# Patient Record
(demographics unavailable — no encounter records)

---

## 2017-06-06 NOTE — PD
HPI


Chief Complaint:  GI Complaint


Time Seen by Provider:  18:50


Travel History


International Travel<30 days:  No


Contact w/Intl Traveler<30days:  No


Traveled to known affect area:  No





History of Present Illness


HPI


17-year-old female came to the emergency room with history of perianal lesions 

that she said she has been noticing for past 3 days and they are extremely 

painful and burns.  Patient is sexually active and has had practiced 

unprotected sex.  She does not want her parents to know about this.  Vital 

signs are otherwise stable.  Patient is otherwise a healthy person.  Patient 

denied of any vaginal discharge.





[facility]


Past Medical History


*** Narrative Medical


List of her past medical, surgical, social and family history as reviewed from 

the nursing note.


Medical History:  Denies Significant Hx


Diminished Hearing:  No


Immunizations Current:  Yes


Tetanus Vaccination:  Unknown


Influenza Vaccination:  No


Pregnant?:  Not Pregnant


LMP:  2017


:  1


Para:  0


Miscarriage:  1


Dilation and Curettage (D&C):  Yes





Social History


Alcohol Use:  Yes (OCCASIONALLY )


Tobacco Use:  No


Substance Use:  Yes (MARIJUANA, EVERY THREE DAYS )





Allergies-Medications


(Allergen,Severity, Reaction):  


Coded Allergies:  


     No Known Allergies (Unverified , 10/13/16)


Comments


List of her allergies reviewed from the nursing note.


Reported Meds & Prescriptions





Reported Meds & Active Scripts


Active


Flagyl (Metronidazole) 250 Mg Tab 250 Mg PO TID 7 Days


Acyclovir 200 Mg Cap 200 Mg PO 5 TIMES A DAY 7 Days


Tramadol (Tramadol HCl) 50 Mg Tab 50 Mg PO Q6H PRN


Orphenadrine CR (Orphenadrine Citrate) 100 Mg Tab 100 Mg PO Q12HR


Ibuprofen 600 Mg Tab 600 Mg PO Q6H PRN





Narrative Medication


List of her home medications reviewed from the nursing note.





Review of Systems


Except as stated in HPI:  all other systems reviewed are Neg





Physical Exam


Narrative


GENERAL: Awake, alert, anxious, moderate distress


SKIN: Focused skin assessment warm/dry.


HEAD: Atraumatic. Normocephalic. 


EYES: Pupils equal and round. No scleral icterus. No injection or drainage. 


ENT: No nasal bleeding or discharge.  Mucous membranes pink and moist.


NECK: Trachea midline. No JVD. 


CARDIOVASCULAR: Regular rate and rhythm.  No murmur appreciated.


RESPIRATORY: No accessory muscle use. Clear to auscultation. Breath sounds 

equal bilaterally. 


GASTROINTESTINAL: Abdomen soft, non-tender, nondistended. Hepatic and splenic 

margins not palpable. 


: External inspection shows multiple confluent vesicular lesions in the 

Perianal and the perineal area.  Foul-smelling discharge was noticed to be 

coming out from the introitus.  Speculum exam showed whitish foul smelling 

discharge.  No obvious CMT or adnexal tenderness.


MUSCULOSKELETAL: No obvious deformities. No clubbing.  No cyanosis.  No edema. 


NEUROLOGICAL: Awake and alert. No obvious cranial nerve deficits.  Motor 

grossly within normal limits. Normal speech.


PSYCHIATRIC: Appropriate mood and affect; insight and judgment normal.





Data


Data


Last Documented VS





Vital Signs








  Date Time  Temp Pulse Resp B/P Pulse Ox O2 Delivery O2 Flow Rate FiO2


 


17 18:30   18     


 


17 17:31 98.2 80  124/77 99   








Orders





 Gc And Chlamydia Pcr (17 19:32)


Wet Prep Profile (17 19:32)


Urinalysis - C+S If Indicated (17 19:32)


Metronidazole (Flagyl) (17 19:45)


Azithromycin Powd Pack (Zithromax Powd P (17 19:45)


Ceftriaxone Inj (Rocephin Inj) (17 19:45)


Ed Urine Pregnancytest Poc (17 19:32)


Acyclovir (Zovirax) (17 20:45)





Labs





 Laboratory Tests








Test 17





 20:15


 


Urine Color LIGHT-YELLOW 


 


Urine Turbidity CLEAR 


 


Urine pH 7.0 


 


Urine Specific Gravity 1.008 


 


Urine Protein NEG mg/dL


 


Urine Glucose (UA) NEG mg/dL


 


Urine Ketones NEG mg/dL


 


Urine Occult Blood NEG 


 


Urine Nitrite NEG 


 


Urine Bilirubin NEG 


 


Urine Urobilinogen LESS THAN 2.0





 MG/DL


 


Urine Leukocyte Esterase SMALL 


 


Urine RBC 1 /hpf


 


Urine WBC 4 /hpf


 


Urine Squamous Epithelial <1 /hpf





Cells 


 


Urine Bacteria RARE /hpf


 


Urine Mucus FEW /lpf


 


Urine Trichomonas PRESENT 


 


Microscopic Urinalysis Comment CULT NOT





 INDICATED


 


Clue Cells (Wet Prep) NONE SEEN 


 


Vaginal Trichomonas (Wet Prep) PRESENT 


 


Vaginal Yeast (Wet Prep) NONE SEEN 


 


Chlamydia trachomatis DNA NOT DETECTED 





(PCR) 


 


Neisseria gonorrhoeae DNA NOT DETECTED 





(PCR) 











MDM


Medical Decision Making


Medical Screen Exam Complete:  Yes


Emergency Medical Condition:  Yes


Medical Record Reviewed:  Yes


Differential Diagnosis


Herpes, STD, pregnancy


Narrative Course


8:47 PM lesions are very specific for herpes genitalis.  Wet mount is positive 

for Trichomonas.  GC and chlamydia are pending.  However given the exam I 

prefer to treat her for PID and acyclovir was given as well.  I discussed this 

with the patient.  She'll be discharged home on description for Flagyl and 

acyclovir.





Procedures


EKG Prior to Arrival:  No





Diagnosis





 Primary Impression:  


 PID (acute pelvic inflammatory disease)


 Additional Impressions:  


 Trichomonas vaginitis


 Herpes genitalis in women


Referrals:  


Primary Care Physician





***Additional Instructions:


Please take the medication as per the prescription direction.  You should not 

be having unprotected sex for the next 3 weeks.  Your partner/partners needs to 

be treated as well.  Please return to the ER if the condition worsens or any 

other concerns.


***Med/Other Pt SpecificInfo:  Prescription(s) given


Scripts


Metronidazole (Flagyl)250 Mg [id number] Mg PO TID  7 Days  Ref 0


   Prov:Yumiko Wright MD         17 


Acyclovir 200 Mg [id number] Mg PO 5 TIMES A DAY  7 Days  Ref 0


   Prov:Yumiko Wright MD         17


Disposition:  01 DISCHARGE HOME


Condition:  Stable








Yumiko Wright MD 2017 19:23

## 2017-10-07 NOTE — PD
HPI


Chief Complaint:  Gyn Problem/Complaint


Time Seen by Provider:  00:10


Travel History


International Travel<30 days:  No


Contact w/Intl Traveler<30days:  No


Traveled to known affect area:  No





History of Present Illness


HPI


17-year-old female patient who states she is several months pregnant but does 

not have OB/GYN follow-up yet, presents to the ER today for abdominal 

discomfort and several days of whitish foul smelling discharge.  She denies any 

vomiting, fevers, or any other symptoms.  Pain is a 4 out of 10.  She does not 

know any exacerbating alleviating factors.





Modifying Factors: None


Associated Signs & Symptoms: Pregnant, abdominal pains, whitish vaginal 

discharge


Risk Factors: None





PFSH


Past Medical History


Medical History:  Denies Significant Hx


Diminished Hearing:  No


Immunizations Current:  Yes


Pregnant?:  Pregnant


LMP:  17


:  2


Para:  0


Miscarriage:  1


Dilation and Curettage (D&C):  Yes





Social History


Alcohol Use:  No


Tobacco Use:  No


Substance Use:  Yes (MARIJUANA, EVERY THREE DAYS )





Allergies-Medications


(Allergen,Severity, Reaction):  


Coded Allergies:  


     No Known Allergies (Unverified , 10/6/17)


Reported Meds & Prescriptions





Reported Meds & Active Scripts


Active


Flagyl (Metronidazole) 250 Mg Tab 250 Mg PO TID 7 Days


Acyclovir 200 Mg Cap 200 Mg PO 5 TIMES A DAY 7 Days


Tramadol (Tramadol HCl) 50 Mg Tab 50 Mg PO Q6H PRN


Orphenadrine CR (Orphenadrine Citrate) 100 Mg Tab 100 Mg PO Q12HR


Ibuprofen 600 Mg Tab 600 Mg PO Q6H PRN








Review of Systems


Except as stated in HPI:  all other systems reviewed are Neg





Physical Exam


Narrative


GENERAL: Young -American female patient currently in mild distress.  

Awake and oriented 3.


SKIN: Focused skin assessment warm/dry.


HEAD: Atraumatic. Normocephalic. 


EYES: Pupils equal and round. No scleral icterus. No injection or drainage. 


ENT: No nasal bleeding or discharge.  Mucous membranes pink and moist.


NECK: Trachea midline. No JVD. 


CARDIOVASCULAR: Regular rate and rhythm.  No murmur appreciated.


RESPIRATORY: No accessory muscle use. Clear to auscultation. Breath sounds 

equal bilaterally. 


GASTROINTESTINAL: Abdomen gravid, with uterine fundus at the umbilical area, non

-tender, nondistended. Hepatic and splenic margins not palpable. 


MUSCULOSKELETAL: No obvious deformities. No clubbing.  No cyanosis.  No edema. 


NEUROLOGICAL: Awake and alert. No obvious cranial nerve deficits.  Motor 

grossly within normal limits. Normal speech.


PSYCHIATRIC: Appropriate mood and affect; insight and judgment normal.





Data


Data


Last Documented VS





Vital Signs








  Date Time  Temp Pulse Resp B/P (MAP) Pulse Ox O2 Delivery O2 Flow Rate FiO2


 


10/6/17 23:54  90 18 110/60 (77) 100 Room Air  


 


10/6/17 22:45 98.8       








Orders





 Orders


Gc And Chlamydia Pcr (10/7/17 00:10)


Wet Prep Profile (10/7/17 00:10)


Urinalysis - C+S If Indicated (10/7/17 00:10)


Ed Poc Ultrasound (10/7/17 )


Urine Culture (10/7/17 00:15)


Metronidazole (Flagyl) (10/7/17 01:45)





Labs





Laboratory Tests








Test


  10/7/17


00:15 10/7/17


00:50


 


Urine Color YELLOW  


 


Urine Turbidity HAZY  


 


Urine pH 6.0  


 


Urine Specific Gravity 1.026  


 


Urine Protein TRACE mg/dL  


 


Urine Glucose (UA) NEG mg/dL  


 


Urine Ketones NEG mg/dL  


 


Urine Occult Blood NEG  


 


Urine Nitrite NEG  


 


Urine Bilirubin NEG  


 


Urine Urobilinogen


  LESS THAN 2.0


MG/DL 


 


 


Urine Leukocyte Esterase LARGE  


 


Urine RBC 4 /hpf  


 


Urine WBC 7 /hpf  


 


Urine Squamous Epithelial


Cells 2 /hpf 


  


 


 


Urine Bacteria MOD /hpf  


 


Urine Yeast (Budding) RARE  


 


Microscopic Urinalysis Comment


  CULTURE


INDICATED 


 


 


Clue Cells (Wet Prep)  PRESENT 


 


Vaginal Trichomonas (Wet Prep)  NS 


 


Vaginal Yeast (Wet Prep)  PRESENT 











MDM


Medical Decision Making


Medical Screen Exam Complete:  Yes


Emergency Medical Condition:  Yes


Medical Record Reviewed:  Yes


Interpretation(s)





Laboratory Tests








Test


  10/7/17


00:15 10/7/17


00:50


 


Urine Turbidity HAZY (CLEAR)  


 


Urine Leukocyte Esterase LARGE (NEG)  


 


Urine RBC 4 /hpf (0-3)  


 


Urine WBC 7 /hpf (0-5)  


 


Urine Bacteria


  MOD /hpf


(NONE) 


 


 


Urine Yeast (Budding) RARE (NONE)  


 


Clue Cells (Wet Prep)  PRESENT (NONE) 


 


Vaginal Yeast (Wet Prep)  PRESENT (NONE) 








Differential Diagnosis


Abdominal discomfort, pregnant, vaginal discharge: UTI versus vaginitis versus 

abdominal pain and pregnancy versus threatened AB


Narrative Course


Transabdominal ultrasound and evaluation shows a IUP with uterine fundus at 

about the umbilicus, and patient states that she has not had her menses since 

 or July, she is likely to be about 20 weeks along.  Lab work shows UTI and 

wet prep is positive for both clue cells and yeast.  At this point, my plan 

would be to treat her for all 3 issues.  She was given Flagyl in the ER.  She 

will be given UTI treatment as well as yeast infection treatment.  Follow-up 

with OB/GYN.  Return for new issues as needed.  The plan has been discussed 

with her and she states understanding.





Procedures


**Procedure Narrative**


Transabdominal ultrasound done by me shows IUP with good fetal heart tones in 

the 140s.  Good fetal movements.





Diagnosis





 Primary Impression:  


 Abdominal pain in pregnancy


 Additional Impressions:  


 BV (bacterial vaginosis)


 UTI (urinary tract infection)


 Candidal vaginitis


***Med/Other Pt SpecificInfo:  Prescription(s) given


Scripts


Miconazole 3 Vaginal Cream (Monistat 3 Vaginal Cream) 4 % Cream


1 APPL VAGINAL HS for Infection, #1 BOX 0 Refills


   Prov: Omi Quiroz MD         10/7/17 


Metronidazole (Flagyl) 500 Mg Tab


500 MG PO BID for Infection for 7 Days, #14 TAB 0 Refills


   Prov: Omi Quiroz MD         10/7/17 


Amoxicillin (Amoxicillin) 500 Mg Tab


500 MG PO BID for Infection for 7 Days, #14 TAB 0 Refills


   Prov: Omi Quiroz MD         10/7/17


Disposition:  01 DISCHARGE HOME


Condition:  Stable











Omi Quiroz MD Oct 7, 2017 00:13

## 2017-12-06 NOTE — PD
HPI


Chief Complaint


Low blood


Date Seen:  Dec 6, 2017


Time Seen:  17:32


Travel History


International Travel<30 Days:  No


Contact w/Intl Traveler<30Days:  No


Known Affected Area:  No





History of Present Illness


HPI


Patient 18-year-old white female  at 29 week [facility] 

and sent here by them today for anemia.  Patient had her blood drawn yesterday 

with a hemoglobin of 6.6 which also correlates with  hematocrit of about 19-20% 

on the patient is not on iron therapy at this time, she does eat   cornstarch a 

lot.  Baby is active fetal heart rate tracing is reactive for 29 weeks she is 

not cynthia


Weeks Gestation:  29


Para:  0


:  2


Miscarriage:  1





History


Obstetric History


Obstetric History


1 pregnancy loss early





Social History


*** Narrative Social History


Patient states that she thought records starts so she is a patient with pica


Alcohol Use:  No


Tobacco Use:  No


Substance Abuse:  No





Allergies-Medications


(Allergen,Severity, Reaction):  


Coded Allergies:  


     No Known Allergies (Unverified , 10/16/17)


Home Meds


Active Scripts


Acyclovir (Acyclovir) 400 Mg Tab, 400 MG PO BID for Mgmt Viral Infection, #60 

TAB 7 Refills


   Prov:Apple Fitzpatrick         17


Multi-Vit/Iron-Folic Acid-B12-Vit C (Ferralet) 90-1-0.012-120 mg Tab, 1 TAB PO 

DAILY, #30 BOTTLE 11 Refills


   Prov:Apple Fitzpatrick         10/16/17


Prenatal Vit W/ Ferric Phospha (Vitafol Gummies 3.33-0.333-34.8 mg) 1 Chw Chw, 

3 TAB PO DAILY, #90 BOTTLE 11 Refills


   Prov:Apple Fitzpatrick         10/16/17





Review of Systems


General / Constitutional:  No: Fever, Weight Gain, Chills, Other


Eyes:  No: Diploplia, Blurred Vision, Visual changes, Pain, Photophobia


HENT:  No: Headaches, Vertigo, Lightheadedness


Cardiovascular:  No: Irregular Rhythm, Chest Pain or Discomfort, Palpitations, 

Tachycardia, Syncope, Varicosities, Edema, Cyanosis


Respiratory:  No: Cough, Short of Breath, Other


Gastrointestinal:  No: Nausea, Vomiting, Diarrhea


Genitourinary:  No: Decreased Urinary Output, Oliguria


Musculoskeletal:  No: Limited ROM, Weakness, Cramping, Edema, Pain


Skin:  No Rash, No Itching, No Dryness, No Lumps, No Change in Pigmentation, No 

Change in Nails, No Alopecia, No Lesions


Neurologic:  No: Weakness, Dizziness, Syncope, Focal Abnormalities, 

Coordination Problem, Headache, Slurred Speech, Seizures


Psychiatric:  No: Depression, Suicidal Ideations, Homicidal Ideation


Endocrine:  No: Heat Intolerance, Cold Intolerance, Polydipsia, Polyuria, Other





Physical Exam


Narrative


GENERAL: Well-nourished, well-developed patient.


SKIN: Warm and dry.


HEAD: Normocephalic and atraumatic.


EYES: No scleral icterus. No injection or drainage. 


ENT: No nasal drainage noted. Mucous membranes pink. Airway patent.


NECK: Supple, trachea midline. No JVD.


CARDIOVASCULAR: Regular rate and rhythm without murmurs, gallops, or rubs. 


RESPIRATORY: Breath sounds equal bilaterally. No accessory muscle use.


BREASTS: Bilateral exam showed no masses , no retractions, no nipple discharge.


ABDOMEN/GI: Abdomen soft, non-tender, bowel sounds present, no rebound, no 

guarding 


   Gravid to [29-] weeks size


   Fundal Height: [29-]


GENITOURINARY: 


        


   Membranes: [intact ]


   Uterine Contractions: [none-]


FHT's: 


   Category: [1-]   


   Baseline: [-133]   


   Reactive: [-yes]   


   Variability: [mod-]  


   Decels: [-0]  


EXTREMITIES: No cyanosis or edema.


BACK: Nontender without obvious deformity. No CVA tenderness.


NEUROLOGICAL: Awake and alert. Motor and sensory grossly within normal limits. 

Five out of 5 muscle strength in all muscle groups. Normal speech.





MDM


Interpretation(s)


Patient is a 1 8-year-old black female  A1 who is now 29 weeks gestation 

[facility] and that her blood yesterday and a 

hemoglobin of 6.6 and she is not on any iron therapy.  Patient is no real 

knowledge of a history of anemia.  She  she has no obstetric complication,  

baby is active size equal dates fetal heart rate tracing is reactive 29 weeks 

is not cynthia


Plan


Plan to start the patient on 325 mg of elemental iron 3 times a day with meals 

and recheck her H&H and 3-4 weeks.  Today we'll draw all remaining anemia 

workup laboratory including TIBC serum iron B12 folate retic count


Diagnosis


Diagnosis:  


 Primary Impression:  


 Anemia affecting first pregnancy


 Additional Impression:  


 History of pica


Disposition:  01 DISCHARGE HOME


Condition:  Stable


Scripts


Ferrous Sulfate DR (Ferrous Sulfate ) 324 Mg Tabdr


324 MG PO DAILY for Nutritional Supplement, #90 TAB 0 Refills


   Prov: Maged Baca II, MD         17











Maged Baca II, MD Dec 6, 2017 17:46

## 2018-01-02 NOTE — HHI.HP
HPI


Travel History


International Travel<30 Days:  No


Contact w/Intl Traveler<30Days:  No


Known Affected Area:  No


 (Sydnee Bull MD)





History of Present Illness


HPI


18 yr old F  at 33/2 weeks with chronic anemia presents to the ED 

pleuritic chest pain. Accompanied by friend. Patient states that CP started 

yesterday. She describes chest pain as stabbing, sharp, constant, substernal, 5/

10, 7/10 when coughing. Chest pain is worse with coughing and deep breaths. 

Cough is productive of thick whitish sputum. She also endorses sore throat, 

runny nose, and HA. She denies sick contacts, SOB, abdominal pain,diarrhea, 

contractions, LOF, vaginal bleeding, vaginal discharge, and dysuria. 


Patient was recently seen in OB ED on  by Dr. Baca. She was diagnosed with 

iron deficiency anemia and prescribed iron tablets. Patient states that she has 

been unable to  her prescription. She reports that she experiences 

occasional lightheadedness at work. She denies dizziness and fatigue. She was 

found to have Hb of 6.5 in the ED today. Sent up to OB ED for further 

evaluation and treatment.  She has been receiving [facility] care at [facility]. She has hx of noncompliance and does not have prenatal records. 


 (Sydnee Bull MD)





History


Past Medical History


*** Narrative Medical


Hx of chronic anemia


 (Sydnee Bull MD)





Obstetric History


Obstetric History





1 miscarriage 


 (Sydnee Bull MD)





Past Surgical History


Surgical History:  No Previous Surgery


 (Sydnee Bull MD)





Family History


*** Narrative Family History


No hx of sickle cell disease or other blood disorders


Family History:  Negative


 (Sydnee Bull MD)





Social History


Alcohol Use:  No


Tobacco Use:  No


Substance Abuse:  No


 (Sydnee Bull MD)





Allergies-Medications


(Allergen,Severity, Reaction):  


Coded Allergies:  


     No Known Allergies (Verified  Adverse Reaction, Unknown, 18)


Home Meds


Active Scripts


Prenatal Vit W/ Ferric Phospha (Vitafol Gummies 3.33-0.333-34.8 mg) 1 Chw Chw, 

3 TAB PO DAILY, #90 BOTTLE 11 Refills


   Prov:Chappuis,Apple B. [facility]         10/16/17


Reported Medications


Acyclovir (Acyclovir) 400 Mg Tab, 400 MG PO BID for Mgmt Viral Infection, TAB 0 

Refills


   18


Discontinued Scripts


Ferrous Sulfate DR (Ferrous Sulfate DR) 324 Mg Tabdr, 324 MG PO DAILY for 

Nutritional Supplement, #90 TAB 0 Refills


   Prov:Maged Baca II, MD         17


Acyclovir (Acyclovir) 400 Mg Tab, 400 MG PO BID for Mgmt Viral Infection, #60 

TAB 7 Refills


   Prov:Apple Fitzpatrick [facility]         17


Multi-Vit/Iron-Folic Acid-B12-Vit C (Ferralet) 90-1-0.012-120 mg Tab, 1 TAB PO 

DAILY, #30 BOTTLE 11 Refills


   Prov:Apple Fitzpatrick [facility]         10/16/17





Review of Systems


Except as stated in HPI:  all other systems reviewed are Neg


 (Sydnee Bull MD R1)





Physical Exam





Vital Signs








  Date Time  Temp Pulse Resp B/P (MAP) Pulse Ox O2 Delivery O2 Flow Rate FiO2


 


18 17:01     99 Nasal Cannula 2.00 


 


18 17:01  107 15  99 Nasal Cannula 2.00 


 


18 16:36  105 15  100 Room Air  


 


18 14:53 99.0 80 18 152/85 (107) 100   








Narrative


GENERAL: Well-nourished, well-developed patient.


SKIN: Warm and dry.


HEAD: Normocephalic and atraumatic.


EYES: No scleral icterus. No injection or drainage. 


ENT: No nasal drainage noted. Mucous membranes pink. Airway patent.


NECK: Supple, trachea midline. No JVD.


CARDIOVASCULAR: Tachycardic, no m/r/g


RESPIRATORY: Breath sounds equal bilaterally. No accessory muscle use.


ABDOMEN/GI: Abdomen soft, non-tender, bowel sounds present, no rebound, no 

guarding 


   Gravid to 35 weeks size


EXTREMITIES: No cyanosis or edema.


BACK: Nontender without obvious deformity. No CVA tenderness.


NEUROLOGICAL: Awake and alert. Motor and sensory grossly within normal limits. 

Five out of 5 muscle strength in all muscle groups. Normal speech.


 (Sydnee Bull MD R1)





Caprini VTE Risk Assessment


Caprini VTE Risk Assessment:  No/Low Risk (score <= 1)


Caprini Risk Assessment Model











 Point Value = 1          Point Value = 2  Point Value = 3  Point Value = 5


 


Age 41-60


Minor surgery


BMI > 25 kg/m2


Swollen legs


Varicose veins


Pregnancy or postpartum


History of unexplained or recurrent


   spontaneous 


Oral contraceptives or hormone


   replacement


Sepsis (< 1 month)


Serious lung disease, including


   pneumonia (< 1 month)


Abnormal pulmonary function


Acute myocardial infarction


Congestive heart failure (< 1 month)


History of inflammatory bowel disease


Medical patient at bed rest Age 61-74


Arthroscopic surgery


Major open surgery (> 45 min)


Laparoscopic surgery (> 45 min)


Malignancy


Confined to bed (> 72 hours)


Immobilizing plaster cast


Central venous access Age >= 75


History of VTE


Family history of VTE


Factor V Leiden


Prothrombin 53373W


Lupus anticoagulant


Anticardiolipin antibodies


Elevated serum homocysteine


Heparin-induced thrombocytopenia


Other congenital or acquired


   thrombophilia Stroke (< 1 month)


Elective arthroplasty


Hip, pelvis, or leg fracture


Acute spinal cord injury (< 1 month)








Prophylaxis Regimen











   Total Risk


Factor Score Risk Level Prophylaxis Regimen


 


0-1      Low Early ambulation


 


2 Moderate Order ONE of the following:


*Sequential Compression Device (SCD)


*Heparin 5000 units SQ BID


 


3-4 Higher Order ONE of the following medications:


*Heparin 5000 units SQ TID


*Enoxaparin/Lovenox 40 mg SQ daily (WT < 150 kg, CrCl > 30 mL/min)


*Enoxaparin/Lovenox 30 mg SQ daily (WT < 150 kg, CrCl > 10-29 mL/min)


*Enoxaparin/Lovenox 30 mg SQ BID (WT < 150 kg, CrCl > 30 mL/min)


AND/OR


*Sequential Compression Device (SCD)


 


5 or more Highest Order ONE of the following medications:


*Heparin 5000 units SQ TID (Preferred with Epidurals)


*Enoxaparin/Lovenox 40 mg SQ daily (WT < 150 kg, CrCl > 30 mL/min)


*Enoxaparin/Lovenox 30 mg SQ daily (WT < 150 kg, CrCl > 10-29 mL/min)


*Enoxaparin/Lovenox 30 mg SQ BID (WT < 150 kg, CrCl > 30 mL/min)


AND


*Sequential Compression Device (SCD)








 (Sydnee Bull MD R1)





Data


Data


Vital Signs Reviewed:  Yes


Orders





 Orders


Electrocardiogram (18 )


Complete Blood Count With Diff (18 16:47)


Comprehensive Metabolic Panel (18 16:47)


Magnesium (Mg) (18 16:47)


Prothrombin Time / Inr (Pt) (18 16:47)


Act Partial Throm Time (Ptt) (18 16:47)


Chest, Single Ap (18 16:47)


Ecg Monitoring (18 16:47)


Iv Access Insert/Monitor (18 16:47)


Oximetry (18 16:47)


Oxygen Administration (18 16:47)


Sodium Chloride 0.9% Flush (Ns Flush) (18 17:00)


Sodium Chlorid 0.9% 500 Ml Inj (Ns 500 M (18 17:00)


Influenzae A/B Antigen (18 16:47)


Fetal Heart Tones (18 16:47)


Al-Mag Hy-Si 40-40-4 Mg/Ml Liq (Mag-Al P (18 17:00)


Lidocaine 2% Viscous (Xylocaine 2% Visco (18 17:00)


Type And Screen (18 18:17)


Admit Order (Ed Use Only) (18 18:26)


Labs





Laboratory Tests








Test


  18


17:10


 


White Blood Count 9.5 


 


Red Blood Count 3.71 


 


Hemoglobin 6.5 


 


Hematocrit 22.2 


 


Mean Corpuscular Volume 59.8 


 


Mean Corpuscular Hemoglobin 17.5 


 


Mean Corpuscular Hemoglobin


Concent 29.2 


 


 


Red Cell Distribution Width 23.5 


 


Platelet Count 224 


 


Mean Platelet Volume 8.1 


 


Neutrophils (%) (Auto) 81.8 


 


Lymphocytes (%) (Auto) 5.4 


 


Monocytes (%) (Auto) 11.6 


 


Eosinophils (%) (Auto) 1.0 


 


Basophils (%) (Auto) 0.2 


 


Neutrophils # (Auto) 7.7 


 


Lymphocytes # (Auto) 0.5 


 


Monocytes # (Auto) 1.1 


 


Eosinophils # (Auto) 0.1 


 


Basophils # (Auto) 0.0 


 


CBC Comment DIFF FINAL 


 


Differential Comment  


 


Prothrombin Time 10.2 


 


Prothromb Time International


Ratio 1.0 


 


 


Activated Partial


Thromboplast Time 23.2 


 


 


Blood Urea Nitrogen 5 


 


Creatinine 0.59 


 


Random Glucose 66 


 


Total Protein 7.3 


 


Albumin 2.9 


 


Calcium Level 8.8 


 


Magnesium Level 1.6 


 


Alkaline Phosphatase 126 


 


Aspartate Amino Transf


(AST/SGOT) 19 


 


 


Alanine Aminotransferase


(ALT/SGPT) 20 


 


 


Total Bilirubin 0.4 


 


Sodium Level 136 


 


Potassium Level 3.7 


 


Chloride Level 106 


 


Carbon Dioxide Level 21.7 


 


Anion Gap 8 














 Date/Time


Source Procedure


Growth Status


 


 


 18 17:15


Nasal Washing Influenza Types A,B Antigen (ROHITH) - Final


NEGATIVE FOR FLU A AND B ANTIGEN.... Complete








 (Sydnee Bull MD R1)





Assessment/Plan


Assessment and Plan


18 yr old F  at 33/2 weeks admitted for acute on chronic iron deficiency 

anemia





1. Anemia during pregnancy


-Hb of 6.5, MCV 59.8


-Iron panel - Iron 17L , TIBC 406 H, % saturation 3.3L, Ferritin 4L 


-2 units of RBCs ordered, H &H post transfusion


-Iron transfusion, Venofer 100mg IV once 


-Hb electrophoresis and sickle cell screen ordered 





2. Pleuritic chest pain


-EKG, sinus tachycardia without significant ST-T changes


-CXR, prominence of the perivascular markings with crowding of the 

bronchovascular markings may be due to expiratory state of this radiograph, 

however slight interstitial process is not excluded and the cardiac silhouette 

appears enlarged as well possibly technical


-Tessalon 100mg PO TID PRN cough


-Mucinex ER 500mg PO BID PRN cough





3.Non-compliance


-Prenatal labs ordered





s/d/w Dr. Bernabe and Dr. Skelton 


 (Sydnee Bull MD R1)


Attending Attestation


Patient seen and evaluated with resident under direct supervision, agree with 

assessment and plan.


 (Philip Bernabe MD)











Sydnee Bull MD R1 2018 21:25


Philip Bernabe MD 2018 23:15

## 2018-01-02 NOTE — RADRPT
EXAM DATE/TIME:  01/02/2018 17:03 

 

[facility] COMPARISON:     

CHEST SINGLE AP, April 10, 2016, 0:21.  CHEST PA & LAT, October 13, 2016, 6:40.

 

                     

INDICATIONS :     

Chest pain for 2 days, shortness of breath.

                     

 

MEDICAL HISTORY :     

None.          

 

SURGICAL HISTORY :     

None.   

 

ENCOUNTER:     

Initial                                        

 

ACUITY:     

1 day      

 

PAIN SCORE:     

6/10

 

LOCATION:     

Bilateral  chest

 

FINDINGS:     

There is slight cardiomegaly could be technical. The appearance is different as compared to the prior
 examination partially due to expiratory state of this radiograph as well. There is prominence of the
 perivascular markings with crowding of the bronchovascular markings may be due to expiratory state o
f this radiograph, however slight interstitial process is not excluded. Focal consolidation is not se
en.

 

CONCLUSION:     There is prominence of the perivascular markings with crowding of the bronchovascular
 markings may be due to expiratory state of this radiograph, however slight interstitial process is n
ot excluded and the cardiac silhouette appears enlarged as well possibly technical.

 

 

 SHASTA Villarreal MD on January 02, 2018 at 17:18           

Board Certified Radiologist.

 This report was verified electronically.

## 2018-01-02 NOTE — PD
HPI


Chief Complaint:  Chest Pain


Time Seen by Provider:  16:41


Travel History


International Travel<30 days:  No


Contact w/Intl Traveler<30days:  No


Traveled to known affect area:  No





History of Present Illness


HPI


18-year-old Afro-American female presents the emergency Department with report 

of substernal chest discomfort since last evening.  She states it is pleuritic 

in nature and worse with cough or deep breath.  She has had a clear cough but 

denies chills, nausea, heartburn, or abdominal pain.  She denies upper 

respiratory symptoms.  Patient is noted to have a history of anemia requiring 

transfusions 2 weeks ago.  Patient states her pain is about an 8 out of 10.  It 

is worse with cough and deep breath.  Patient is 35 weeks pregnant.  He has no 

known drug allergies.





PFSH


Past Medical History


Anemia:  Yes


Diminished Hearing:  No


Immunizations Current:  Yes


Tetanus Vaccination:  Unknown


Influenza Vaccination:  No


Pregnant?:  Pregnant


:  2


Para:  0


Miscarriage:  1


Dilation and Curettage (D&C):  Yes





Past Surgical History


Surgical History:  No Previous Surgery





Social History


Alcohol Use:  No


Tobacco Use:  No


Substance Use:  Yes (MARIJUANA, EVERY THREE DAYS )





Allergies-Medications


(Allergen,Severity, Reaction):  


Coded Allergies:  


     No Known Allergies (Verified  Adverse Reaction, Unknown, 18)


Reported Meds & Prescriptions





Reported Meds & Active Scripts


Active


Vitafol Gummies 3.33-0.333-34.8 mg (Prenatal Vit W/ Ferric Phospha) 1 Chw Chw 3 

Tab PO DAILY


Reported


Acyclovir 400 Mg Tab 400 Mg PO BID








Review of Systems


Except as stated in HPI:  all other systems reviewed are Neg


General / Constitutional:  Positive: Chills, No: Fever


Eyes:  No: Visual changes


HENT:  No: Headaches, Vertigo, Lightheadedness, Sore Throat, Rhinitis, 

Rhinorrhea, Congestion, Nosebleed, Neck Stiffness, Neck Pain, Dental 

Difficulties, Earache


Cardiovascular:  Positive: Chest Pain or Discomfort


Respiratory:  Positive: Pleuritic Pain, No: Shortness of Breath


Gastrointestinal:  Positive: Dysphagia, No: Nausea, Vomiting (see history of 

present illness), Diarrhea, Abdominal Pain, Loss of Appetite


Genitourinary:  No: Urgency, Frequency, Dysuria


Musculoskeletal:  No: Pain


Skin:  No Rash


Neurologic:  No: Weakness


Psychiatric:  No: Depression


Endocrine:  No: Polydipsia


Hematologic/Lymphatic:  No: Easy Bruising





Physical Exam


Narrative


GENERAL: Patient appears in no acute distress.  Vital signs show low-grade 

temperature of 99.  Patient is tachycardic at 1 15 bpm.


SKIN: Warm and dry.  Normal color.  Normal turgor.


HEAD: Atraumatic. Normocephalic. 


EYES: Pupils equal and round. No scleral icterus. No injection or drainage. 


ENT: No nasal bleeding or discharge.  Mucous membranes pink and moist.  Pharynx 

is clear.  Airway is patent.


NECK: Trachea midline.  Supple nontender.


CARDIOVASCULAR: Tachycardic rate and normal rhythm.  


RESPIRATORY: No accessory muscle use. Clear to auscultation. Breath sounds 

equal bilaterally.  Patient complains of pleuritic pain with deep breath.  It 

is centralized to the anterior chest, midline.


GASTROINTESTINAL: Patient has obvious gravid uterus.  Abdomen soft, non-tender, 

nondistended. Hepatic and splenic margins not palpable. 


MUSCULOSKELETAL: Extremities without clubbing, cyanosis, or edema. No obvious 

deformities. 


NEUROLOGICAL: Awake and alert. No obvious cranial nerve deficits.  Motor 

grossly within normal limits. Five out of 5 muscle strength in the arms and 

legs.  Normal speech.


PSYCHIATRIC: Appropriate mood and affect; insight and judgment normal.





Data


Data


Last Documented VS





Vital Signs








  Date Time  Temp Pulse Resp B/P (MAP) Pulse Ox O2 Delivery O2 Flow Rate FiO2


 


18 17:01     99 Nasal Cannula 2.00 


 


18 17:01  107 15     


 


18 14:53 99.0   152/85 (107)    








Orders





 Orders


Electrocardiogram (18 )


Complete Blood Count With Diff (18 16:47)


Comprehensive Metabolic Panel (18 16:47)


Magnesium (Mg) (18 16:47)


Prothrombin Time / Inr (Pt) (18 16:47)


Act Partial Throm Time (Ptt) (18 16:47)


Chest, Single Ap (18 16:47)


Ecg Monitoring (18 16:47)


Iv Access Insert/Monitor (18 16:47)


Oximetry (18 16:47)


Oxygen Administration (18 16:47)


Sodium Chloride 0.9% Flush (Ns Flush) (18 17:00)


Sodium Chlorid 0.9% 500 Ml Inj (Ns 500 M (18 17:00)


Influenzae A/B Antigen (18 16:47)


Fetal Heart Tones (18 16:47)


Al-Mag Hy-Si 40-40-4 Mg/Ml Liq (Mag-Al P (18 17:00)


Lidocaine 2% Viscous (Xylocaine 2% Visco (18 17:00)


Type And Screen (18 18:17)





Labs





Laboratory Tests








Test


  18


17:10


 


White Blood Count 9.5 TH/MM3 


 


Red Blood Count 3.71 MIL/MM3 


 


Hemoglobin 6.5 GM/DL 


 


Hematocrit 22.2 % 


 


Mean Corpuscular Volume 59.8 FL 


 


Mean Corpuscular Hemoglobin 17.5 PG 


 


Mean Corpuscular Hemoglobin


Concent 29.2 % 


 


 


Red Cell Distribution Width 23.5 % 


 


Platelet Count 224 TH/MM3 


 


Mean Platelet Volume 8.1 FL 


 


Neutrophils (%) (Auto) 81.8 % 


 


Lymphocytes (%) (Auto) 5.4 % 


 


Monocytes (%) (Auto) 11.6 % 


 


Eosinophils (%) (Auto) 1.0 % 


 


Basophils (%) (Auto) 0.2 % 


 


Neutrophils # (Auto) 7.7 TH/MM3 


 


Lymphocytes # (Auto) 0.5 TH/MM3 


 


Monocytes # (Auto) 1.1 TH/MM3 


 


Eosinophils # (Auto) 0.1 TH/MM3 


 


Basophils # (Auto) 0.0 TH/MM3 


 


CBC Comment DIFF FINAL 


 


Differential Comment  


 


Blood Urea Nitrogen 5 MG/DL 


 


Creatinine 0.59 MG/DL 


 


Random Glucose 66 MG/DL 


 


Total Protein 7.3 GM/DL 


 


Albumin 2.9 GM/DL 


 


Calcium Level 8.8 MG/DL 


 


Magnesium Level 1.6 MG/DL 


 


Alkaline Phosphatase 126 U/L 


 


Aspartate Amino Transf


(AST/SGOT) 19 U/L 


 


 


Alanine Aminotransferase


(ALT/SGPT) 20 U/L 


 


 


Total Bilirubin 0.4 MG/DL 


 


Sodium Level 136 MEQ/L 


 


Potassium Level 3.7 MEQ/L 


 


Chloride Level 106 MEQ/L 


 


Carbon Dioxide Level 21.7 MEQ/L 


 


Anion Gap 8 MEQ/L 











[facility]


Medical Decision Making


Medical Screen Exam Complete:  Yes


Emergency Medical Condition:  Yes


Medical Record Reviewed:  Yes


Differential Diagnosis


Chest pain.  Pregnancy.  PE.  Reflux.  Influenza.  Bronchitis.


Narrative Course


Patient appears medically stable at time of exam.


EKG shows sinus tachycardia without significant ST-T changes.  This is reviewed 

by Dr. Feliciano.


Patient discussed with Dr. Feliciano.


Labs ordered including CBC, CMP, urinalysis.


IV access is obtained patient is given 500 mils normal saline bolus.


Chest x-ray with shielding is ordered.


Rapid influenza is ordered.


GI cocktail is ordered.


CBC shows no significant cytosis.  Hemoglobin is low at 6.5 similar to previous 

admission.  Hematocrit is 22.2.  And has appearance of microcytic anemia.


Coagulation studies are unremarkable.


Chemistries show albumin of 2.9, otherwise no significant findings.


Rapid influenza test is negative.


Chest x-ray shows:





CONCLUSION:     There is prominence of the perivascular markings with crowding 

of the bronchovascular markings may be due to expiratory state of this 

radiograph, however slight interstitial process is not excluded and the cardiac 

silhouette appears enlarged as well possibly technical





Type and screen is ordered.


Call was placed to Dr. Hicks, and patient was discussed.  He accepted the 

patient for admission for her anemia.





Diagnosis





 Primary Impression:  


 Anemia during pregnancy





Admitting Information


Admitting Physician Requests:  Admit


Condition:  Stable











Eulogio Carroll 2018 16:55

## 2018-01-03 NOTE — PD.OB.ANTE
Subjective


Interval History


Patient is an 18 year old F  at 33/3 weeks with chronic anemia admitted 

to observation for symptomatic anemia. She is status-post 2 U PRBCs and feels 

significantly better. She denies dizziness, lightheadedness, syncope, headache, 

chest pain, shortness of breath. Fatigue overall improved. Eating without 

difficulty.





She is also s/p IV iron infusion.





HGb 6.5 in ED --> 2U PRBCs --> 7.7





Patient was recently seen in OB ED on  by Dr. Baca. She was diagnosed with 

iron deficiency anemia and prescribed iron tablets. She has been receiving 

prenatal care at [facility]. She has hx of noncompliance and does not have 

prenatal records.


Antepartum ROS:  Reports: Fetal movement normal, 


   Denies: New complaints, Loss of fluid, Vaginal bleeding, Contractions (

Kristin Coppola MD R2)





Objective


Vital Signs





Vital Signs








  Date Time  Temp Pulse Resp B/P (MAP) Pulse Ox O2 Delivery O2 Flow Rate FiO2


 


1/3/18 07:59   18     


 


1/3/18 07:58 98.2       


 


1/3/18 07:57  101  107/55 (72)    


 


1/3/18 02:31 97.4  18     


 


1/3/18 02:30  112  115/72 (86)    


 


1/3/18 02:30   18     


 


1/3/18 02:15   18     


 


1/3/18 02:09 98.2 112 18 112/74    


 


1/3/18 02:09  112  112/74 (87)    


 


1/3/18 01:17   18     


 


1/3/18 01:16  100  109/47 (67)    


 


1/3/18 00:30   18     


 


1/3/18 00:28  101  105/59 (74)    


 


1/3/18 00:26  93  111/68 (82)    


 


1/3/18 00:26 99.0       


 


1/3/18 00:00 98.8 120 18 114/69    


 


18 23:57  120 18 114/69 (84)    


 


18 23:56 98.9       


 


18 23:00 98.8       


 


18 17:01     99 Nasal Cannula 2.00 


 


18 17:01  107 15  99 Nasal Cannula 2.00 


 


18 16:36  105 15  100 Room Air  


 


18 14:53 99.0 80 18 152/85 (107) 100   














Intake & Output  


 


 1/3/18 1/3/18





 07:00 19:00


 


Intake Total 400 ml 


 


Balance 400 ml 


 


  


 


Packed Cells 400 ml 








Lab & Micro Results











Test


  18


17:10 18


22:26 1/3/18


04:20


 


White Blood Count 9.5 TH/MM3   8.5 TH/MM3 


 


Red Blood Count 3.71 MIL/MM3   3.90 MIL/MM3 


 


Hemoglobin 6.5 GM/DL   7.7 GM/DL 


 


Hematocrit 22.2 %   25.0 % 


 


Mean Corpuscular Volume 59.8 FL   64.0 FL 


 


Mean Corpuscular Hemoglobin 17.5 PG   19.6 PG 


 


Mean Corpuscular Hemoglobin


Concent 29.2 % 


  


  30.6 % 


 


 


Red Cell Distribution Width 23.5 %   27.4 % 


 


Platelet Count 224 TH/MM3   198 TH/MM3 


 


Mean Platelet Volume 8.1 FL   8.0 FL 


 


Neutrophils (%) (Auto) 81.8 %   67.7 % 


 


Lymphocytes (%) (Auto) 5.4 %   13.1 % 


 


Monocytes (%) (Auto) 11.6 %   17.6 % 


 


Eosinophils (%) (Auto) 1.0 %   1.1 % 


 


Basophils (%) (Auto) 0.2 %   0.5 % 


 


Neutrophils # (Auto) 7.7 TH/MM3   5.8 TH/MM3 


 


Lymphocytes # (Auto) 0.5 TH/MM3   1.1 TH/MM3 


 


Monocytes # (Auto) 1.1 TH/MM3   1.5 TH/MM3 


 


Eosinophils # (Auto) 0.1 TH/MM3   0.1 TH/MM3 


 


Basophils # (Auto) 0.0 TH/MM3   0.0 TH/MM3 


 


CBC Comment DIFF FINAL   AUTO DIFF 


 


Differential Comment


   


  


  AUTO DIFF


CONFIRMED


 


Prothrombin Time 10.2 SEC   


 


Prothromb Time International


Ratio 1.0 RATIO 


  


  


 


 


Activated Partial


Thromboplast Time 23.2 SEC 


  


  


 


 


Blood Urea Nitrogen 5 MG/DL   


 


Creatinine 0.59 MG/DL   


 


Random Glucose 66 MG/DL   


 


Total Protein 7.3 GM/DL   


 


Albumin 2.9 GM/DL   


 


Calcium Level 8.8 MG/DL   


 


Magnesium Level 1.6 MG/DL   


 


Alkaline Phosphatase 126 U/L   


 


Aspartate Amino Transf


(AST/SGOT) 19 U/L 


  


  


 


 


Alanine Aminotransferase


(ALT/SGPT) 20 U/L 


  


  


 


 


Total Bilirubin 0.4 MG/DL   


 


Sodium Level 136 MEQ/L   


 


Potassium Level 3.7 MEQ/L   


 


Chloride Level 106 MEQ/L   


 


Carbon Dioxide Level 21.7 MEQ/L   


 


Anion Gap 8 MEQ/L   


 


Sickle Cell Screen  NEG  


 


Rubella Immunity Screen  IMMUNE  


 


Rubella Antibody, Quantitative  133.7 IU/mL  


 


Tear Drop Cells   1+ 


 


Ovalocytes   2+ 














 Date/Time


Source Procedure


Growth Status


 


 


 18 22:55


Genital Genital Region Group B Streptococcus Screen


Pending Received


 


 18 17:15


Nasal Washing Influenza Types A,B Antigen (ROHITH) - Final


NEGATIVE FOR FLU A AND B ANTIGEN.... Complete








Physical Exam


GENERAL: Well-nourished, well-developed female. Alert.


SKIN: Warm and dry. No rashes or ecchymoses.


HEAD: Normocephalic and atraumatic.


EYES: No scleral icterus. No injection or drainage. 


ENT: No nasal drainage noted. Mucous membranes pink. Airway patent.


NECK: Supple, trachea midline. No JVD.


CARDIOVASCULAR: Tachycardic, no murmurs, gallops, rubs.


RESPIRATORY: Breath sounds equal bilaterally. No accessory muscle use.


ABDOMEN/GI: Abdomen soft, non-tender, bowel sounds present, no rebound, no 

guarding.


   Gravid to 36 weeks size.


EXTREMITIES: No cyanosis or edema.


BACK: Nontender without obvious deformity. No CVA tenderness.


NEUROLOGICAL: Awake and alert. Motor and sensory grossly within normal limits. 

Five out of 5 muscle strength in all muscle groups. Normal speech.


 (Kristin Coppola MD R2)





Assessment and Plan


Assessment and Plan


18 yr old F  at 33/2 weeks admitted for acute on chronic iron deficiency 

anemia. Symptoms and CBC improved. Discharge home today.





1. Anemia during pregnancy - improved


-Hb of 6.5, MCV 59.8--> Hgb 7.7 on repeat 1/3/2018


-Iron panel - Iron 17L , TIBC 406 H, % saturation 3.3L, Ferritin 4L


-2 units of RBCs ordered and transfused overnight


-Iron transfusion, Venofer 100mg IV x 1


-Hb electrophoresis and sickle cell screen ordered


-Discharge home with iron sulfate 325mg BID script, follow up in one with with 

CFW





2. Pleuritic chest pain- resolved, likely related to anemia


-EKG, sinus tachycardia without significant ST-T changes


-CXR, prominence of the perivascular markings with crowding of the 

bronchovascular markings may be due to expiratory state of this radiograph, to 

follow clinically


-Tessalon 100mg PO TID PRN cough


-Mucinex ER 500mg PO BID PRN cough





3.Non-compliance


-Prenatal labs ordered, CFW to follow as outpt





Dw Dr. Bernabe


 (Kristin Coppola MD R2)


Assessment and Plan


Patient seen and evaluated with resident under direct supervision, agree with 

assessment and plan.


 (Philip Bernabe MD)











Kristin Coppola MD R2 Abhilash 3, 2018 08:41


Philip Bernabe MD 2018 11:35

## 2018-01-03 NOTE — HHI.DCPOC
Discharge Care Plan


Diagnosis:  


(1) Anemia during pregnancy


Report Symptoms to Your Doctor


-Temperature above 100.5 degrees


-Redness, of incision or excessive or foul smelling drainage


-Unusual pain or calf pain


-Increased vaginal bleeding


-Painful or difficulty urinating


-Feelings of extreme sadness or anxiety after 2 weeks


Goals to Promote Your Health


* To prevent worsening of your condition and complications


* To maintain your health at the optimal level


Directions to Meet Your Goals


*** Take your medications as prescribed


*** Follow your dietary instruction


*** Follow activity as directed


*** Ensure plenty of rest for recovery


*** Drink fluids for hydration








*** Keep your appointments as scheduled


*** Take your immunizations and boosters as scheduled


*** If your symptoms worsen call your PCP, if no PCP go to Urgent Care Center 

or Emergency Room***


*** Smoking is Dangerous to Your Health. Avoid second hand smoke***


***Call the 24-hour crisis hotline for domestic abuse at 1-694.516.2480***











Kristin Coppola MD R2 Abhilash 3, 2018 08:38

## 2018-01-03 NOTE — EKG
Date Performed: 01/02/2018       Time Performed: 15:01:22

 

PTAGE:      18 years

 

EKG:      SINUS TACHYCARDIA ABNORMAL RHYTHM ECG 

 

NO PREVIOUS TRACING            

 

DOCTOR:   Philip Forman  Interpretating Date/Time  01/03/2018 12:24:42

## 2018-01-14 NOTE — HHI.HP
HPI


Chief Complaint


35 weeks pregnant


 labor


Date Seen:  2018


Time Seen:  17:00


Travel History


International Travel<30 Days:  No


Contact w/Intl Traveler<30Days:  No


Known Affected Area:  No





History of Present Illness


HPI


Patient is a 17 yo  at 35 weeks.


Pt has EDC  2018 , prenatal care with [facility].


Patient states she has h/o anemia.





Patient reports contractions starting about 13:30


Reports contractions about every 4 minutes before arriving on OB ED.


Contractions are painful 8/10 and stop her in her tracks.





GBS is negative.





No vaginal bleeding or leaking


Active fetal movements.





Patient made cervical change from 3cm to 4cm despite SQ Terbulatine and IVF.


Contractions are still painful.


Patient has received first dose Betamethasone.


Weeks Gestation:  35


Para:  0


:  2


:  1





History


Past Medical History


*** Narrative Medical


Anemia





Obstetric History


Obstetric History


1st trimester miscarriage





Past Surgical History


Surgical History:  No Previous Surgery





Family History


Family History:  





Social History


Alcohol Use:  No


Tobacco Use:  No


Substance Abuse:  No





Allergies-Medications


(Allergen,Severity, Reaction):  


Coded Allergies:  


     No Known Allergies (Verified  Adverse Reaction, Unknown, 18)


Home Meds


Active Scripts


Ferrous Sulfate (Ferrous Sulfate) 325 Mg (65 Mg Iron) Tablet, 325 MG PO BIDPC 

for Nutritional Supplement, #60 TAB 1 Refill


   Prov:Kristin Coppola MD         1/3/18


Prenatal Vit W/ Ferric Phospha (Vitafol Gummies 3.33-0.333-34.8 mg) 1 Chw Chw, 

3 TAB PO DAILY, #90 BOTTLE 11 Refills


   Prov:Apple Fitzpatrick         10/16/17


Reported Medications


Acyclovir (Acyclovir) 400 Mg Tab, 400 MG PO BID for Mgmt Viral Infection, TAB 0 

Refills


   18





Review of Systems


Except as stated in HPI:  all other systems reviewed are Neg





Physical Exam





Vital Signs








  Date Time  Temp Pulse Resp B/P (MAP) Pulse Ox O2 Delivery O2 Flow Rate FiO2


 


18 17:21  114  105/49 (67)    


 


18 17:20  113      








Narrative


GENERAL: Well-nourished, well-developed patient.


SKIN: Warm and dry.


HEAD: Normocephalic and atraumatic.


EYES: No scleral icterus. No injection or drainage. 


ENT: No nasal drainage noted. Mucous membranes pink. Airway patent.


NECK: Supple, trachea midline. No JVD.


CARDIOVASCULAR: Regular rate and rhythm without murmurs, gallops, or rubs. 


RESPIRATORY: Breath sounds equal bilaterally. No accessory muscle use.


BREASTS: Bilateral exam showed no masses , no retractions, no nipple discharge.


ABDOMEN/GI: Abdomen soft, non-tender, bowel sounds present, no rebound, no 

guarding 


   Gravid to [35] weeks size


   Fundal Height: [35]


GENITOURINARY: 


   External Genitalia: intact and normal in appearance


   BUS glands: [wnl]


   Cervix: [soft]


   Dilatation: [4cm]          


   Effacement: [70%]          


   Station: [-3]  


   Presentation: [vertex]        


   Membranes: [intact] bulging membranes


   Uterine Contractions: [4-5 minutes]


FHT's: 


   Category: [1]   


   Baseline: [120s]   


   Reactive: [-]   


   Variability: [moderate]  


   Decels: [none]  


EXTREMITIES: No cyanosis or edema.


BACK: Nontender without obvious deformity. No CVA tenderness.


NEUROLOGICAL: Awake and alert. Motor and sensory grossly within normal limits. 

Five out of 5 muscle strength in all muscle groups. Normal speech.





Caprini VTE Risk Assessment


Caprini VTE Risk Assessment:  No/Low Risk (score <= 1)


Caprini Risk Assessment Model











 Point Value = 1          Point Value = 2  Point Value = 3  Point Value = 5


 


Age 41-60


Minor surgery


BMI > 25 kg/m2


Swollen legs


Varicose veins


Pregnancy or postpartum


History of unexplained or recurrent


   spontaneous 


Oral contraceptives or hormone


   replacement


Sepsis (< 1 month)


Serious lung disease, including


   pneumonia (< 1 month)


Abnormal pulmonary function


Acute myocardial infarction


Congestive heart failure (< 1 month)


History of inflammatory bowel disease


Medical patient at bed rest Age 61-74


Arthroscopic surgery


Major open surgery (> 45 min)


Laparoscopic surgery (> 45 min)


Malignancy


Confined to bed (> 72 hours)


Immobilizing plaster cast


Central venous access Age >= 75


History of VTE


Family history of VTE


Factor V Leiden


Prothrombin 02463O


Lupus anticoagulant


Anticardiolipin antibodies


Elevated serum homocysteine


Heparin-induced thrombocytopenia


Other congenital or acquired


   thrombophilia Stroke (< 1 month)


Elective arthroplasty


Hip, pelvis, or leg fracture


Acute spinal cord injury (< 1 month)








Prophylaxis Regimen











   Total Risk


Factor Score Risk Level Prophylaxis Regimen


 


0-1      Low Early ambulation


 


2 Moderate Order ONE of the following:


*Sequential Compression Device (SCD)


*Heparin 5000 units SQ BID


 


3-4 Higher Order ONE of the following medications:


*Heparin 5000 units SQ TID


*Enoxaparin/Lovenox 40 mg SQ daily (WT < 150 kg, CrCl > 30 mL/min)


*Enoxaparin/Lovenox 30 mg SQ daily (WT < 150 kg, CrCl > 10-29 mL/min)


*Enoxaparin/Lovenox 30 mg SQ BID (WT < 150 kg, CrCl > 30 mL/min)


AND/OR


*Sequential Compression Device (SCD)


 


5 or more Highest Order ONE of the following medications:


*Heparin 5000 units SQ TID (Preferred with Epidurals)


*Enoxaparin/Lovenox 40 mg SQ daily (WT < 150 kg, CrCl > 30 mL/min)


*Enoxaparin/Lovenox 30 mg SQ daily (WT < 150 kg, CrCl > 10-29 mL/min)


*Enoxaparin/Lovenox 30 mg SQ BID (WT < 150 kg, CrCl > 30 mL/min)


AND


*Sequential Compression Device (SCD)











Data


Data


Vital Signs Reviewed:  Yes


Orders





 Orders


Vital Signs (Adult) .ON ADMISSION (18 15:45)


^ Labor Status (18 15:45)


Urinalysis - C+S If Indicated (18 15:45)


^ Non Stress Test (18 15:45)


Diet Liquid (18 Dinner)


Cbc No Diff, Includes Plts (18 15:45)


Terbutaline Inj (Brethine Inj) (18 15:45)


Lactated Ringer's 1000 Ml Inj (Lr 1000 M (18 15:45)


Urine Culture (18 16:00)


Betamethasone Inj (Celestone Soluspan In (18 17:00)


Ob (2e) Additional Admit Info (18 17:08)


Admit To Inpatient (18 )


Code Status (18 17:14)


Vital Signs (Adult) .Per protocol (18 17:14)


Activity Oob Ad Rupali (18 17:14)


Fetal Heart (18 17:14)


Amnioinfusion (18 17:14)


Urinary Catheter Management .ONCE (18 17:14)


Lactated Ringer's 1000 Ml Inj (Lr 1000 M (18 17:14)


Lactated Ringer's 1000 Ml Inj (Lr 1000 M (18 17:14)


Sodium Chlorid 0.9% 500 Ml Inj (Ns 500 M (18 17:15)


Sodium Chlor 0.9% 1000 Ml Inj (Ns 1000 M (18 17:34)


Lidocaine 1% Inj (50 Ml) (Xylocaine 1% I (18 17:15)


Citric Acid-Sodium Citrate Liq (Bicitra (18 17:15)


Fentanyl Inj (Fentanyl Inj) (18 17:15)


Fentanyl Inj (Fentanyl Inj) (18 17:15)


Complete Blood Count With Diff (18 17:14)


Hold Clot (18 17:14)


Abo/Rh Blood Type (18 17:14)


Drug Screen, Random Urine (18 17:14)


Resp Oxygen Non Rebreathe Mask (18 )


^ Epidural / Intrathecal Infus (18 17:14)


Oxytocin 30 Units-500ml Premix (Pitocin (18 17:15)


Lidocaine 1% Inj (50 Ml) (Xylocaine 1% I (18 17:15)


Light Mineral Oil (Muri-Lube Oil) (18 17:15)


Inpatient Certification (18 )


Specimen To Be Collected PRN (18 17:14)


Group B Strep:  Negative


Labs





Laboratory Tests








Test


  18


16:00


 


White Blood Count 16.9 


 


Red Blood Count 4.60 


 


Hemoglobin 9.6 


 


Hematocrit 31.7 


 


Mean Corpuscular Volume 68.9 


 


Mean Corpuscular Hemoglobin 21.0 


 


Mean Corpuscular Hemoglobin


Concent 30.4 


 


 


Red Cell Distribution Width 33.3 


 


Platelet Count 205 


 


Mean Platelet Volume 8.7 


 


Urine Color YELLOW 


 


Urine Turbidity HAZY 


 


Urine pH 6.5 


 


Urine Specific Gravity 1.022 


 


Urine Protein TRACE 


 


Urine Glucose (UA) NEG 


 


Urine Ketones NEG 


 


Urine Occult Blood NEG 


 


Urine Nitrite NEG 


 


Urine Bilirubin NEG 


 


Urine Urobilinogen LESS THAN 2.0 


 


Urine Leukocyte Esterase LARGE 


 


Urine RBC 1 


 


Urine WBC 16 


 


Urine Squamous Epithelial


Cells 12 


 


 


Urine Bacteria RARE 


 


Urine Mucus FEW 


 


Microscopic Urinalysis Comment


  CULTURE


INDICATED














 Date/Time


Source Procedure


Growth Status


 


 


 18 16:00


Urine Clean Catch Urine Culture


Pending Received











Assessment/Plan


Assessment and Plan


17 yo  at 35 weeks.


 labor, with cervical change from 3cm to 4cm over 1 hour despite 

terbutaline tocolysis and IVF.


GBS is negative.


Admitted to L&D.


Expectant management for now.











Jacoby Webb MD 2018 17:35

## 2018-01-14 NOTE — PD
HPI


Chief Complaint


uterine contractions


35 weeks


Date Seen:  2018


Time Seen:  15:44


Travel History


International Travel<30 Days:  No


Contact w/Intl Traveler<30Days:  No


Known Affected Area:  No





History of Present Illness


HPI


Patient is a 17 yo  at 35 weeks.


Pt has EDC  2018 , prenatal care with [facility].


Patient states she has h/o anemia.





Patient reports contractions starting about 13:30


Reports contractions about every 4 minutes before arriving on OB ED.


Contractions are painful 8/10 and stop her in her tracks.





GBS is negative.





No vaginal bleeding or leaking


Active fetal movements.





Patient made cervical change from 3cm to 4cm despite SQ Terbulatine and IVF.


Contractions are still painful.


Patient has received first dose Betamethasone.


Weeks Gestation:  35


Para:  0


:  2


Miscarriage:  1





History


Past Medical History


*** Narrative Medical


Anemia





Obstetric History


Obstetric History


prior miscarriage





Past Surgical History


Surgical History:  No Previous Surgery





Family History


Family History:  Negative





Social History


Alcohol Use:  No


Tobacco Use:  No


Substance Abuse:  No





Allergies-Medications


(Allergen,Severity, Reaction):  


Coded Allergies:  


     No Known Allergies (Verified  Adverse Reaction, Unknown, 18)


Home Meds


Active Scripts


Ferrous Sulfate (Ferrous Sulfate) 325 Mg (65 Mg Iron) Tablet, 325 MG PO BIDPC 

for Nutritional Supplement, #60 TAB 1 Refill


   Prov:Kristin Coppola MD         1/3/18


Prenatal Vit W/ Ferric Phospha (Vitafol Gummies 3.33-0.333-34.8 mg) 1 Chw Chw, 

3 TAB PO DAILY, #90 BOTTLE 11 Refills


   Prov:Apple Fitzpatrick         10/16/17


Reported Medications


Acyclovir (Acyclovir) 400 Mg Tab, 400 MG PO BID for Mgmt Viral Infection, TAB 0 

Refills


   18





Review of Systems


Except as stated in HPI:  all other systems reviewed are Neg





Physical Exam


Narrative


GENERAL: Well-nourished, well-developed patient.


SKIN: Warm and dry.


HEAD: Normocephalic and atraumatic.


EYES: No scleral icterus. No injection or drainage. 


ENT: No nasal drainage noted. Mucous membranes pink. Airway patent.


NECK: Supple, trachea midline. No JVD.


CARDIOVASCULAR: Regular rate and rhythm without murmurs, gallops, or rubs. 


RESPIRATORY: Breath sounds equal bilaterally. No accessory muscle use.


BREASTS: Bilateral exam showed no masses , no retractions, no nipple discharge.


ABDOMEN/GI: Abdomen soft, non-tender, bowel sounds present, no rebound, no 

guarding 


   Gravid to [35 ] weeks size


   Fundal Height: [35]


GENITOURINARY: 


   External Genitalia: intact and normal in appearance


   BUS glands: [wnl]


   Cervix: [soft]


   Dilatation: [3cm]          


   Effacement: [70%]          


   Station: [-3]  


   Presentation: [vertex]        


   Membranes: [intact]


   Uterine Contractions: [5-10 minutes]


FHT's: 


   Category: [1]   


   Baseline: [120s]   


   Reactive: [-]   


   Variability: [moderate]  


   Decels: [-]  


EXTREMITIES: No cyanosis or edema.


BACK: Nontender without obvious deformity. No CVA tenderness.


NEUROLOGICAL: Awake and alert. Motor and sensory grossly within normal limits. 

Five out of 5 muscle strength in all muscle groups. Normal speech.





Data


Data


Vital Signs Reviewed:  Yes


Group B Strep:  Negative





MDM


Medical Record Reviewed:  Yes


Plan


17 yo  at 35 weeks.


Fetal status reassuring.


Patient presents with contractions and cervix is dilated 3cm.





GBS is negative





We plan Terbutaline .25 x 1


Betamethasone 12mg IM x 1. 


IV fluid bolus.


If we are unable to abort contractions, or if aborted and still with cervical 

progress we will admit for labor.





Pt was re-examined after 1 hour and we note cervical change to 4cm.


We will admit to L&D for labor.


we will do expectant management unless further cervical progression or SROM.


Diagnosis


Diagnosis:  


 Primary Impression:  


 35 weeks gestation of pregnancy


 Additional Impressions:  


  labor in third trimester


 Admitted to labor and delivery











Jacoby Webb MD 2018 15:50

## 2018-01-14 NOTE — PD.LABORPN
Subjective


Subjective


still feeling contractions.





Objective


Vital Signs





Vital Signs








  Date Time  Temp Pulse Resp B/P (MAP) Pulse Ox O2 Delivery O2 Flow Rate FiO2


 


18 21:10  111      


 


18 21:05  109      


 


18 21:00  101      


 


18 20:55  105      


 


18 20:50  109      


 


18 20:45  111      


 


18 20:40  106      


 


18 20:35  106      


 


18 20:30  106      


 


18 20:25  107      


 


18 20:20  106      


 


18 20:15  110      


 


18 20:05  108      


 


18 20:00  111      


 


18 19:55  117      


 


18 19:50  111      


 


18 19:45  116      


 


18 19:40  112      


 


18 19:35  115      


 


18 19:25  112      


 


18 19:20  108      


 


18 19:15  105      


 


18 19:10  109      


 


18 19:05  111      


 


18 19:00  105      


 


18 18:55  112      


 


18 18:50  109      


 


18 18:45  105      


 


18 18:40  102      


 


18 18:35  100      


 


18 18:30  98      


 


18 18:25  110      


 


18 18:23 98.2       


 


18 18:23  107  97/60 (72)    


 


18 18:23   18     


 


18 18:20  111      


 


18 18:15  109      


 


18 18:10  103      


 


18 18:05  114      


 


18 18:00  104      


 


18 17:21  114  105/49 (67)    


 


18 17:20  113      








Objective


Pelvic Exam:   


   Cervix: [soft]


   Dilatation: [4cm]          


   Effacement: [80%]          


   Station: [-2]  


   Presentation: [vertex]        


   Membranes: [intact]


   Uterine Contractions: [none]


FHT's: 


   Category: [1]   


   Baseline: [130s]   


   Reactive: [-]   


   Variability: [moderate]  


   Decels: [none]


Weeks Gestation:  35





Assessment/Plan


Assessment and Plan


35 week  labor


4cm with bulging membranes, more effaced.


Fetal status reassuring.


Will repeat exam in 2 hours.











Jacoby Webb MD 2018 21:31

## 2018-01-15 NOTE — PD.OB.DELI
Weeks gestation:  35


Gest age assessed date:  2018


Gest age assessed time:  17:00


Pt started active labor?:  Yes


Active labor start date:  2018


Active labor start time:  21:00


Medical induction of labor?:  No


Artificial rupture of membrane:  No


Anesthesia:  Epidural


Episiotomy:  None


Vaginal Delivery:  Normal, Spontaneous


Presentation:  Occiput anterior


Nuchal Cord:  None


Delayed cord clamping (45 sec):  Yes


Infant:  Female


Delivery date:  Abhilash 15, 2018


Delivery time:  04:22


One Minute APGAR:  9


Five Minute APGAR:  9


Birth Weight:  6lbs


Placenta:  Spontaneous delivery


Laceration:  Vaginal laceration (bilateral oksana-urethral), 1 deg


Estimated blood loss:  150cc











Jacoby Webb MD Abhilash 15, 2018 04:37

## 2018-01-16 NOTE — HHI.DCPOC
Discharge Care Plan


Diagnosis:  


(1) Postpartum care following vaginal delivery


Report Symptoms to Your Doctor


-Temperature above 100.5 degrees


-Redness, of incision or excessive or foul smelling drainage


-Unusual pain or calf pain


-Increased vaginal bleeding


-Painful or difficulty urinating


-Feelings of extreme sadness or anxiety after 2 weeks


Goals to Promote Your Health


* To prevent worsening of your condition and complications


* To maintain your health at the optimal level


Directions to Meet Your Goals


*** Take your medications as prescribed


*** Follow your dietary instruction


*** Follow activity as directed


*** Ensure plenty of rest for recovery


*** Drink fluids for hydration








*** Keep your appointments as scheduled


*** Take your immunizations and boosters as scheduled


*** If your symptoms worsen call your PCP, if no PCP go to Urgent Care Center 

or Emergency Room***


*** Smoking is Dangerous to Your Health. Avoid second hand smoke***


***Call the 24-hour crisis hotline for domestic abuse at 1-251.991.4665***











Cam Pickard MD, R3 Jan 16, 2018 16:43

## 2018-01-16 NOTE — HHI.OB
Subjective


Post Partum Day:  1


Remarks


Ms. Avalos is a 17 yo  who is PPD 1 from  1/15 at 0422.


Patient afebrile with stable vital signs overnight.


Patient reports continued abdominal pain and vaginal bleeding. Patient also 

reports dysuria. Patient reports left lower extremity pain which began while 

she was in labor. Patient ambulating normally. Patient has not yet had a bowel 

movement. No chest pain or shortness of breath.


Regarding patient's anemia, patient reports that she has iron supplements she 

has been taking at home.





Objective


Vitals/I&O





Vital Signs








  Date Time  Temp Pulse Resp B/P (MAP) Pulse Ox O2 Delivery O2 Flow Rate FiO2


 


1/15/18 20:00 97.8       


 


1/15/18 20:00  67 18 100/62 (75)    








Objective Remarks


GENERAL: Well-nourished, well-developed patient.


CARDIOVASCULAR: Regular rate and rhythm without murmurs. Normal perfusion 


RESPIRATORY: CTAB; normal rate


ABDOMEN/GI: Abdomen soft, non-tender. 


   Fundus: Firm, non-tender at umbilicus.


GENITOURINARY: Moderate bleeding.


EXTREMITIES: No cyanosis or edema, non-tender, without signs of DVT.


Medications and IVs





Current Medications








 Medications


  (Trade)  Dose


 Ordered  Sig/Michelle


 Route  Start Time


 Stop Time Status Last Admin


 


  (NS Flush)  2 ml  BID


 IV FLUSH  1/15/18 09:00


     


 


 


  (NS Flush)  2 ml  UNSCH  PRN


 IV FLUSH  1/15/18 04:45


     


 


 


  (Tylenol)  650 mg  Q4H  PRN


 PO  1/15/18 04:45


    18 05:04


 


 


  (Motrin)  800 mg  Q8H  PRN


 PO  1/15/18 04:45


    18 05:03


 


 


  (Americaine 20%


 Top Spr)  1 spray  Q4H  PRN


 TOPICAL  1/15/18 04:45


    1/15/18 10:33


 


 


  (Tucks Pads)  1 applic  QID  PRN


 TOPICAL  1/15/18 04:45


    1/15/18 10:34


 


 


  ([place]-Colace)  2 tab  Q12H  PRN


 PO  1/15/18 04:45


     


 


 


  (Ambien)  5 mg  HS  PRN


 PO  1/15/18 04:45


     


 


 


  (Mag-Al Plus


 Susp Liq)  15 ml  Q8H  PRN


 PO  1/15/18 04:45


     


 


 


  (Zofran  Odt)  4 mg  Q6H  PRN


 PO  1/15/18 04:45


     


 











Assessment/Plan


Problem List:  


(1) Postpartum care following vaginal delivery


ICD Codes:  Z39.2 - Encounter for routine postpartum follow-up


Assessment and Plan


Ms. Avalos is a 17 yo  who is PPD 1 from  1/15 at 0422


-Continue post-partum care


   -Monitor VS, voiding and stooling


   -PRN Motrin and Percocet


   -Continue to encourage ambulation





UA abnormal


Impression: UA on admission with large leukocyte esterase and 16 WBC, rare 

bacteria. Dysuria reported


-Will f/u urine culture and base treatment on Urine Culture results





Anemia


Impression: Hgb 9.6 on admission


-Continue home ferrous sulfate 





L leg pain


Impression: Exam benign, no concern for DVT. Suspect MSK etiology











Cam Pickard MD, R3 2018 08:20